# Patient Record
Sex: MALE | Race: BLACK OR AFRICAN AMERICAN | NOT HISPANIC OR LATINO | URBAN - METROPOLITAN AREA
[De-identification: names, ages, dates, MRNs, and addresses within clinical notes are randomized per-mention and may not be internally consistent; named-entity substitution may affect disease eponyms.]

---
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
## 2021-08-12 NOTE — ED PEDIATRIC NURSE NOTE - NS TRANSFER PATIENT BELONGINGS
Hossein Gent needs to be seen for an appointment before further refills are given.
none known
Clothing
none known
none known
